# Patient Record
Sex: MALE | Race: WHITE | NOT HISPANIC OR LATINO | Employment: FULL TIME | URBAN - METROPOLITAN AREA
[De-identification: names, ages, dates, MRNs, and addresses within clinical notes are randomized per-mention and may not be internally consistent; named-entity substitution may affect disease eponyms.]

---

## 2018-03-05 PROBLEM — I20.9 ANGINA, CLASS II: Status: ACTIVE | Noted: 2018-03-05

## 2018-04-19 ENCOUNTER — TELEPHONE (OUTPATIENT)
Dept: GASTROENTEROLOGY | Facility: CLINIC | Age: 58
End: 2018-04-19

## 2018-04-19 DIAGNOSIS — K85.90 ACUTE PANCREATITIS, UNSPECIFIED COMPLICATION STATUS, UNSPECIFIED PANCREATITIS TYPE: Primary | ICD-10-CM

## 2018-04-20 NOTE — TELEPHONE ENCOUNTER
Message   Received: Today   Message Contents   MD Sharona Chavez MA   Caller: Unspecified (Yesterday,  5:37 PM)             Please schedule an EUS for pancreatitis. Can be done at [facility] if easier for the patient.   Mejia Lin MD      Please sign order

## 2018-04-26 ENCOUNTER — TELEPHONE (OUTPATIENT)
Dept: GASTROENTEROLOGY | Facility: CLINIC | Age: 58
End: 2018-04-26

## 2018-04-26 NOTE — TELEPHONE ENCOUNTER
Dr Lin,   You placed an order for an EUS. Patient is on Plavix and cannot hold. You asked me to check with the referring MD, Dr Cano. He asked if you could go ahead and do the EUS on Plavix. He and the patient understand that you will not be able to biopsy if there is anything.

## 2018-04-26 NOTE — TELEPHONE ENCOUNTER
Message   Received: Today   Message Contents   MD Sharona Chavez MA   Caller: Unspecified (Today,  8:19 AM)             Ok for EUS on Plavix.

## 2018-04-30 ENCOUNTER — TELEPHONE (OUTPATIENT)
Dept: ENDOSCOPY | Facility: HOSPITAL | Age: 58
End: 2018-04-30

## 2018-04-30 NOTE — TELEPHONE ENCOUNTER
Spoke with patient's wife about instructions for UEUS scheduled 5/4/18 at 0900.  Answered questions.  He is not permitted to hold Plavix due to recent coronary stenting.  Our AES MD, patient and referring physician are aware and procedure will be done but will not plan to biopsy.

## 2018-05-04 ENCOUNTER — HOSPITAL ENCOUNTER (OUTPATIENT)
Facility: HOSPITAL | Age: 58
Discharge: HOME | End: 2018-05-04
Attending: INTERNAL MEDICINE | Admitting: INTERNAL MEDICINE
Payer: COMMERCIAL

## 2018-05-04 ENCOUNTER — ANESTHESIA (OUTPATIENT)
Dept: ENDOSCOPY | Facility: HOSPITAL | Age: 58
End: 2018-05-04
Payer: COMMERCIAL

## 2018-05-04 ENCOUNTER — ANESTHESIA EVENT (OUTPATIENT)
Dept: ENDOSCOPY | Facility: HOSPITAL | Age: 58
End: 2018-05-04
Payer: COMMERCIAL

## 2018-05-04 ENCOUNTER — SURGERY (OUTPATIENT)
Age: 58
End: 2018-05-04

## 2018-05-04 VITALS
WEIGHT: 315 LBS | OXYGEN SATURATION: 98 % | HEIGHT: 70 IN | SYSTOLIC BLOOD PRESSURE: 100 MMHG | TEMPERATURE: 99 F | DIASTOLIC BLOOD PRESSURE: 53 MMHG | HEART RATE: 62 BPM | BODY MASS INDEX: 45.1 KG/M2 | RESPIRATION RATE: 12 BRPM

## 2018-05-04 DIAGNOSIS — K85.90 PANCREATITIS: ICD-10-CM

## 2018-05-04 DIAGNOSIS — R10.84 GENERALIZED ABDOMINAL PAIN: Primary | ICD-10-CM

## 2018-05-04 LAB
POCT GLUCOSE: 135 MG/DL (ref 70–110)
POCT GLUCOSE: 135 MG/DL (ref 70–110)

## 2018-05-04 PROCEDURE — 37000009 HC ANESTHESIA EA ADD 15 MINS: Performed by: INTERNAL MEDICINE

## 2018-05-04 PROCEDURE — 63600175 PHARM REV CODE 636 W HCPCS: Performed by: NURSE ANESTHETIST, CERTIFIED REGISTERED

## 2018-05-04 PROCEDURE — 82962 GLUCOSE BLOOD TEST: CPT | Performed by: INTERNAL MEDICINE

## 2018-05-04 PROCEDURE — 25000003 PHARM REV CODE 250: Performed by: NURSE ANESTHETIST, CERTIFIED REGISTERED

## 2018-05-04 PROCEDURE — D9220A PRA ANESTHESIA: Mod: CRNA,,, | Performed by: NURSE ANESTHETIST, CERTIFIED REGISTERED

## 2018-05-04 PROCEDURE — 43259 EGD US EXAM DUODENUM/JEJUNUM: CPT | Performed by: INTERNAL MEDICINE

## 2018-05-04 PROCEDURE — D9220A PRA ANESTHESIA: Mod: ANES,,, | Performed by: ANESTHESIOLOGY

## 2018-05-04 PROCEDURE — 25000003 PHARM REV CODE 250: Performed by: INTERNAL MEDICINE

## 2018-05-04 PROCEDURE — 43259 EGD US EXAM DUODENUM/JEJUNUM: CPT | Mod: ,,, | Performed by: INTERNAL MEDICINE

## 2018-05-04 PROCEDURE — 37000008 HC ANESTHESIA 1ST 15 MINUTES: Performed by: INTERNAL MEDICINE

## 2018-05-04 RX ORDER — TAMSULOSIN HYDROCHLORIDE 0.4 MG/1
0.4 CAPSULE ORAL DAILY
COMMUNITY
End: 2018-12-31

## 2018-05-04 RX ORDER — LIDOCAINE HYDROCHLORIDE 40 MG/ML
SOLUTION TOPICAL
Status: DISCONTINUED | OUTPATIENT
Start: 2018-05-04 | End: 2018-05-04

## 2018-05-04 RX ORDER — SODIUM CHLORIDE 0.9 % (FLUSH) 0.9 %
3 SYRINGE (ML) INJECTION
Status: DISCONTINUED | OUTPATIENT
Start: 2018-05-04 | End: 2018-05-04 | Stop reason: HOSPADM

## 2018-05-04 RX ORDER — PROPOFOL 10 MG/ML
VIAL (ML) INTRAVENOUS CONTINUOUS PRN
Status: DISCONTINUED | OUTPATIENT
Start: 2018-05-04 | End: 2018-05-04

## 2018-05-04 RX ORDER — GLYCOPYRROLATE 0.2 MG/ML
INJECTION INTRAMUSCULAR; INTRAVENOUS
Status: DISCONTINUED | OUTPATIENT
Start: 2018-05-04 | End: 2018-05-04

## 2018-05-04 RX ORDER — PROPOFOL 10 MG/ML
VIAL (ML) INTRAVENOUS
Status: DISCONTINUED | OUTPATIENT
Start: 2018-05-04 | End: 2018-05-04

## 2018-05-04 RX ORDER — SODIUM CHLORIDE 9 MG/ML
INJECTION, SOLUTION INTRAVENOUS CONTINUOUS
Status: DISCONTINUED | OUTPATIENT
Start: 2018-05-04 | End: 2018-05-04 | Stop reason: HOSPADM

## 2018-05-04 RX ADMIN — SODIUM CHLORIDE: 0.9 INJECTION, SOLUTION INTRAVENOUS at 08:05

## 2018-05-04 RX ADMIN — PROPOFOL 50 MG: 10 INJECTION, EMULSION INTRAVENOUS at 08:05

## 2018-05-04 RX ADMIN — GLYCOPYRROLATE 0.2 MG: 0.2 INJECTION, SOLUTION INTRAMUSCULAR; INTRAVENOUS at 08:05

## 2018-05-04 RX ADMIN — LIDOCAINE HYDROCHLORIDE 3 ML: 40 SOLUTION TOPICAL at 08:05

## 2018-05-04 RX ADMIN — PROPOFOL 150 MCG/KG/MIN: 10 INJECTION, EMULSION INTRAVENOUS at 08:05

## 2018-05-04 NOTE — PLAN OF CARE
Discharge instructions reviewed with pt and wife. Understanding verbalized. No complaints of pain reported. Pt able to tolerate PO intake. MD Boland to bedside to address findings and plan of car. To be transported to car by PCT.

## 2018-05-04 NOTE — ANESTHESIA PREPROCEDURE EVALUATION
05/04/2018  Geo Moss is a 57 y.o., male.      Pre-operative evaluation for Procedure(s) (LRB):  ULTRASOUND-ENDOSCOPIC-UPPER (N/A)    Geo Moss is a 57 y.o. male     Patient Active Problem List   Diagnosis    Intra-abdominal abscess post-procedure    Diabetes mellitus type 2 in obese    Coronary artery disease due to lipid rich plaque    S/P coronary artery stent placement    Abdominal abscess    Complications of bariatric procedures    Generalized abdominal pain    Abdominal pain    Angina, class II    Pancreatitis       Review of patient's allergies indicates:  No Known Allergies    No current facility-administered medications on file prior to encounter.      Current Outpatient Prescriptions on File Prior to Encounter   Medication Sig Dispense Refill    allopurinol (ZYLOPRIM) 100 MG tablet Take 100 mg by mouth once daily.       aspirin (ECOTRIN) 81 MG EC tablet Take 81 mg by mouth once daily.      atorvastatin (LIPITOR) 40 MG tablet Take 40 mg by mouth every Mon, Wed, Fri.       clopidogrel (PLAVIX) 75 mg tablet Take 75 mg by mouth once daily.      empagliflozin (JARDIANCE) 10 mg Tab Take 10 mg by mouth once daily.      furosemide (LASIX) 20 MG tablet Take 20 mg by mouth once daily.      isosorbide mononitrate (IMDUR) 30 MG 24 hr tablet Take 30 mg by mouth once daily.       lisinopril 10 MG tablet Take 10 mg by mouth once daily.      metformin (GLUCOPHAGE) 1000 MG tablet Take 1,000 mg by mouth 2 (two) times daily with meals.      metoprolol tartrate (LOPRESSOR) 50 MG tablet Take 50 mg by mouth 2 (two) times daily.      pantoprazole (PROTONIX) 40 MG tablet Take 40 mg by mouth once daily.      colchicine 0.6 mg tablet Take 0.6 mg by mouth daily as needed (gout flare up).      nitroGLYCERIN (NITROSTAT) 0.4 MG SL tablet Place 0.4 mg under the tongue every 5 (five) minutes as needed  for Chest pain.         Past Surgical History:   Procedure Laterality Date    ABDOMINAL SURGERY      removal of lap band    CARDIAC ANGIOGRAM WITH STENT      KNEE SURGERY Left     lap band removal       LAPAROSCOPIC GASTRIC BANDING      neck fusion      ROTATOR CUFF REPAIR Left        Social History     Social History    Marital status:      Spouse name: N/A    Number of children: N/A    Years of education: N/A     Occupational History    Not on file.     Social History Main Topics    Smoking status: Never Smoker    Smokeless tobacco: Never Used    Alcohol use Yes      Comment: occassionally    Drug use: No    Sexual activity: Not on file     Other Topics Concern    Not on file     Social History Narrative    No narrative on file         CBC: No results for input(s): WBC, RBC, HGB, HCT, PLT, MCV, MCH, MCHC in the last 72 hours.    CMP: No results for input(s): NA, K, CL, CO2, BUN, CREATININE, GLU, MG, PHOS, CALCIUM, ALBUMIN, PROT, ALKPHOS, ALT, AST, BILITOT in the last 72 hours.    INR  No results for input(s): PT, INR, PROTIME, APTT in the last 72 hours.      Anesthesia Evaluation    I have reviewed the Patient Summary Reports.     I have reviewed the Medications.     Review of Systems  Cardiovascular:   Hypertension CAD  CABG/stent     Endocrine:   Diabetes        Physical Exam  General:  Morbid Obesity    Airway/Jaw/Neck:  Airway Findings: Mouth Opening: Normal General Airway Assessment: Adult  Mallampati: III  TM Distance: Normal, at least 6 cm  Jaw/Neck Findings:  Neck ROM: Normal ROM  Neck Findings:  Girth Increased      Dental:  Dental Findings: In tact, upper front caps    Chest/Lungs:  Chest/Lungs Clear    Heart/Vascular:  Heart Findings: Normal       Mental Status:  Mental Status Findings:  Cooperative, Alert and Oriented         Anesthesia Plan  Type of Anesthesia, risks & benefits discussed:  Anesthesia Type:  general  Patient's Preference:   Intra-op Monitoring Plan: standard  ASA monitors  Intra-op Monitoring Plan Comments:   Post Op Pain Control Plan:   Post Op Pain Control Plan Comments:   Induction:   IV  Beta Blocker:  Patient is on a Beta-Blocker and has received one dose within the past 24 hours (No further documentation required).       Informed Consent: Patient understands risks and agrees with Anesthesia plan.  Questions answered. Anesthesia consent signed with patient.  ASA Score: 3     Day of Surgery Review of History & Physical:        Anesthesia Plan Notes: Will have a cotton in the room as backup if needed.         Ready For Surgery From Anesthesia Perspective.

## 2018-05-04 NOTE — H&P
Short Stay Endoscopy History and Physical    PCP - Chris Steele MD  Referring Physician - Roberto Diggs MD  [address] [facility] [facility]  [address]  [place] [address]    Procedure - eus  ASA - per anesthesia  Mallampati - per anesthesia  History of Anesthesia problems - no  Family history Anesthesia problems -  no   Plan of anesthesia - General    HPI:  This is a 57 y.o. male here for evaluation of: pancreatitis    Reflux - no  Dysphagia - no  Abdominal pain - no  Diarrhea - no    ROS:  Constitutional: No fevers, chills, No weight loss  CV: No chest pain  Pulm: No cough, No shortness of breath  Ophtho: No vision changes  GI: see HPI  Derm: No rash    Medical History:  has a past medical history of Acid reflux; Angina, class II; Bronchitis; Coronary artery disease; Diabetes mellitus; Encounter for blood transfusion; Gout; Gout; Hemorrhoids; History of kidney stones; HLD (hyperlipidemia); heart artery stent (03/05/2018); Hyperlipidemia; Hypertension; and Pulmonary edema (2016).    Surgical History:  has a past surgical history that includes Abdominal surgery; Rotator cuff repair (Left); neck fusion; Knee surgery (Left); Laparoscopic gastric banding; lap band removal ; and CARDIAC ANGIOGRAM WITH STENT.    Family History: family history includes Heart disease in his mother..    Social History:  reports that he has never smoked. He has never used smokeless tobacco. He reports that he drinks alcohol. He reports that he does not use drugs.    Review of patient's allergies indicates:  No Known Allergies    Medications:   Prescriptions Prior to Admission   Medication Sig Dispense Refill Last Dose    allopurinol (ZYLOPRIM) 100 MG tablet Take 100 mg by mouth once daily.    5/3/2018 at Unknown time    aspirin (ECOTRIN) 81 MG EC tablet Take 81 mg by mouth once daily.   5/3/2018 at Unknown time    atorvastatin (LIPITOR) 40 MG tablet Take 40 mg by mouth every Mon, Wed, Fri.    Past Week at Unknown time    clopidogrel  (PLAVIX) 75 mg tablet Take 75 mg by mouth once daily.   5/4/2018 at 0600    empagliflozin (JARDIANCE) 10 mg Tab Take 10 mg by mouth once daily.   5/3/2018 at Unknown time    furosemide (LASIX) 20 MG tablet Take 20 mg by mouth once daily.   Past Week at Unknown time    isosorbide mononitrate (IMDUR) 30 MG 24 hr tablet Take 30 mg by mouth once daily.    5/4/2018 at Unknown time    lisinopril 10 MG tablet Take 10 mg by mouth once daily.   5/4/2018 at Unknown time    metformin (GLUCOPHAGE) 1000 MG tablet Take 1,000 mg by mouth 2 (two) times daily with meals.   5/3/2018 at Unknown time    metoprolol tartrate (LOPRESSOR) 50 MG tablet Take 50 mg by mouth 2 (two) times daily.   5/4/2018 at Unknown time    pantoprazole (PROTONIX) 40 MG tablet Take 40 mg by mouth once daily.   5/3/2018 at Unknown time    tamsulosin (FLOMAX) 0.4 mg Cp24 Take 0.4 mg by mouth once daily.   5/3/2018 at Unknown time    colchicine 0.6 mg tablet Take 0.6 mg by mouth daily as needed (gout flare up).       nitroGLYCERIN (NITROSTAT) 0.4 MG SL tablet Place 0.4 mg under the tongue every 5 (five) minutes as needed for Chest pain.   Unknown at Unknown time       Physical Exam:    Vital Signs:   Vitals:    05/04/18 0818   BP: 130/72   Pulse: 60   Resp: 16   Temp: 98.1 °F (36.7 °C)       General Appearance: Well appearing in no acute distress  Eyes:    No scleral icterus  ENT: Neck supple  Lungs: CTA anteriorly  Heart:  Regular rate  Abdomen: Soft, non tender, non distended with normal bowel sounds.  Extremities: No edema  Skin: No rash    Labs:  Lab Results   Component Value Date    WBC 5.30 03/06/2018    HGB 14.6 03/06/2018    HCT 42.7 03/06/2018     03/06/2018    ALT 22 08/25/2016    AST 16 08/25/2016     03/06/2018    K 4.5 03/06/2018     03/06/2018    CREATININE 0.70 (L) 03/06/2018    BUN 18 03/06/2018    CO2 27 03/06/2018    TSH 1.060 06/26/2016    INR 1.4 (H) 06/26/2016    HGBA1C 9.1 (H) 08/25/2016       I have explained  the risks and benefits of this endoscopic procedure to the patient including but not limited to bleeding, inflammation, infection, perforation, and death.      Jenaro Boland MD

## 2018-05-04 NOTE — TRANSFER OF CARE
"Anesthesia Transfer of Care Note    Patient: Geo Moss    Procedure(s) Performed: Procedure(s) (LRB):  ULTRASOUND-ENDOSCOPIC-UPPER (N/A)    Patient location: PACU    Anesthesia Type: general    Transport from OR: Transported from OR on 6-10 L/min O2 by face mask with adequate spontaneous ventilation    Post pain: adequate analgesia    Post assessment: no apparent anesthetic complications    Post vital signs: stable    Level of consciousness: awake, alert and oriented    Nausea/Vomiting: no nausea/vomiting    Complications: none    Transfer of care protocol was followed      Last vitals:   Visit Vitals  /72 (BP Location: Left arm, Patient Position: Lying)   Pulse 60   Temp 36.7 °C (98.1 °F) (Temporal)   Resp 16   Ht 5' 10" (1.778 m)   Wt (!) 149.7 kg (330 lb)   SpO2 97%   BMI 47.35 kg/m²     "

## 2018-05-04 NOTE — PROVATION PATIENT INSTRUCTIONS
Discharge Summary/Instructions after an Endoscopic Procedure  Patient Name: Geo Moss  Patient MRN: [id number]  Patient YOB: 1960  Friday, May 04, 2018  Jenaro Boland MD  RESTRICTIONS:  During your procedure today, you received medications for sedation.  These   medications may affect your judgment, balance and coordination.  Therefore,   for 24 hours, you have the following restrictions:   - DO NOT drive a car, operate machinery, make legal/financial decisions,   sign important papers or drink alcohol.    ACTIVITY:  The following day: return to full activity including work, except no heavy   lifting, straining or running for 3 days if polyps were removed.  DIET:  Eat and drink normally unless instructed otherwise.     TREATMENT FOR COMMON SIDE EFFECTS:  - Mild abdominal pain, nausea, belching, bloating or excessive gas:  rest,   eat lightly and use a heating pad.  - Sore Throat: treat with throat lozenges and/or gargle with warm salt   water.  - Because air was used during the procedure, expelling large amounts of air   from your rectum or belching is normal.  - If a bowel prep was taken, you may not have a bowel movement for 1-3 days.    This is normal.  SYMPTOMS TO WATCH FOR AND REPORT TO YOUR PHYSICIAN:  1. Abdominal pain or bloating, other than gas cramps.  2. Chest pain.  3. Back pain.  4. Signs of infection such as: chills or fever occurring within 24 hours   after the procedure.  5. Rectal bleeding, which would show as bright red, maroon, or black stools.   (A tablespoon of blood from the rectum is not serious, especially if   hemorrhoids are present.)  6. Vomiting.  7. Weakness or dizziness.  GO DIRECTLY TO THE NEAREST EMERGENCY ROOM IF YOU HAVE ANY OF THE FOLLOWING:      Difficulty breathing              Chills and/or fever over 101 F   Persistent vomiting and/or vomiting blood   Severe abdominal pain   Severe chest pain   Black, tarry stools   Bleeding- more than one tablespoon   Any other  symptom or condition that you feel may need urgent attention  Your doctor recommends these additional instructions:  If any biopsies were taken, your doctors clinic will contact you in 1 to 2   weeks with any results.  - Discharge patient to home.   - Resume previous diet.   - Continue present medications.   - Return to referring physician as previously scheduled.  For questions, problems or results please call your physician - Jenaro Boland MD at Work:  [phone].  [facility], EMERGENCY ROOM PHONE NUMBER: [phone]  IF A COMPLICATION OR EMERGENCY SITUATION ARISES AND YOU ARE UNABLE TO REACH   YOUR PHYSICIAN - GO DIRECTLY TO THE EMERGENCY ROOM.  Jenaro Boland MD  5/4/2018 9:12:28 AM  This report has been verified and signed electronically.

## 2018-05-04 NOTE — ANESTHESIA POSTPROCEDURE EVALUATION
"Anesthesia Post Evaluation    Patient: Geo Moss    Procedure(s) Performed: Procedure(s) (LRB):  ULTRASOUND-ENDOSCOPIC-UPPER (N/A)    Final Anesthesia Type: general  Patient location during evaluation: [facility]  Patient participation: Yes- Able to Participate  Level of consciousness: awake and alert and oriented  Post-procedure vital signs: reviewed and stable  Pain management: adequate  Airway patency: patent  PONV status at discharge: No PONV  Anesthetic complications: no      Cardiovascular status: blood pressure returned to baseline  Respiratory status: unassisted and spontaneous ventilation  Hydration status: euvolemic  Follow-up not needed.        Visit Vitals  BP (!) 100/53   Pulse 62   Temp 37 °C (98.6 °F) (Skin)   Resp 12   Ht 5' 10" (1.778 m)   Wt (!) 149.7 kg (330 lb)   SpO2 98%   BMI 47.35 kg/m²       Pain/Louis Score: Pain Assessment Performed: Yes (5/4/2018  9:53 AM)  Presence of Pain: denies (5/4/2018  9:53 AM)  Louis Score: 10 (5/4/2018  9:53 AM)      "

## 2018-12-31 PROBLEM — Z79.4 UNCONTROLLED TYPE 2 DIABETES MELLITUS WITH HYPERGLYCEMIA, WITH LONG-TERM CURRENT USE OF INSULIN: Status: ACTIVE | Noted: 2018-12-31

## 2018-12-31 PROBLEM — I10 ESSENTIAL HYPERTENSION: Status: ACTIVE | Noted: 2018-12-31

## 2018-12-31 PROBLEM — E11.65 UNCONTROLLED TYPE 2 DIABETES MELLITUS WITH HYPERGLYCEMIA, WITH LONG-TERM CURRENT USE OF INSULIN: Status: ACTIVE | Noted: 2018-12-31

## 2018-12-31 PROBLEM — E78.2 MIXED HYPERLIPIDEMIA: Status: ACTIVE | Noted: 2018-12-31

## 2019-03-28 PROBLEM — E66.813 CLASS 3 SEVERE OBESITY DUE TO EXCESS CALORIES WITH SERIOUS COMORBIDITY AND BODY MASS INDEX (BMI) OF 45.0 TO 49.9 IN ADULT: Status: ACTIVE | Noted: 2019-03-28

## 2019-03-28 PROBLEM — E66.01 CLASS 3 SEVERE OBESITY DUE TO EXCESS CALORIES WITH SERIOUS COMORBIDITY AND BODY MASS INDEX (BMI) OF 45.0 TO 49.9 IN ADULT: Status: ACTIVE | Noted: 2019-03-28

## 2021-03-15 PROBLEM — I20.89 EXERTIONAL ANGINA: Status: ACTIVE | Noted: 2021-03-15

## 2021-03-15 PROBLEM — I25.10 CAD (CORONARY ARTERY DISEASE): Status: ACTIVE | Noted: 2021-03-15

## 2021-03-15 PROBLEM — R94.39 ABNORMAL MYOCARDIAL PERFUSION STUDY: Status: ACTIVE | Noted: 2021-03-15

## 2022-01-31 ENCOUNTER — TELEPHONE (OUTPATIENT)
Dept: CARDIOTHORACIC SURGERY | Facility: CLINIC | Age: 62
End: 2022-01-31
Payer: COMMERCIAL

## 2022-01-31 PROBLEM — I20.89 ANGINAL EQUIVALENT: Status: ACTIVE | Noted: 2022-01-31

## 2022-01-31 PROBLEM — R06.09 DYSPNEA ON EXERTION: Status: ACTIVE | Noted: 2022-01-31

## 2022-01-31 NOTE — TELEPHONE ENCOUNTER
Called pt to schedule consultation appointment. Pt scheduled for first available appointment. Provided directions to clinic location. Pt verbalized understanding.       ----- Message from Evelia Paulino RN sent at 2022 10:49 AM CST -----  Regarding: Referral  Referral for CABG  Op note below  Sending discs via mail and [facility]    Patient name: Geo Moss  MRN: [id number]  : 1960  Surgery Date: 2022   Performing Physician: Justin Garcia MD  Pre-op Diagnosis:  Abnormal myocardial perfusion study [R94.39]  Angina, class II [I20.9]  CAD S/P percutaneous coronary angioplasty [I25.10, Z98.61]  DM (diabetes mellitus) [E11.9]  Chronic diastolic heart failure [I50.32]  Post-op Diagnosis:  Post-Op Diagnosis Codes:     # Abnormal myocardial perfusion study [R94.39]     # Angina, class II [I20.9]     # CAD S/P percutaneous coronary angioplasty [I25.10, Z98.61]     # DM (diabetes mellitus) [E11.9]     # Chronic diastolic heart failure [I50.32] Procedure(s) (LRB):  Left heart cath (Left)  Anesthesia: RN IV Sedation  INDICATIONS:   Dyspnea on exertion, angina equivalent CCS class 2, abnormal myocardial perfusion scan, history of multivessel CAD and previous PCI with multiple risk factors for CAD.  PROCEDURE:  Right and Left heart catheterization, coronary angiography.  ACCESS SITE:  Left radial artery, left brachial vein. Post operative hemostasis achieved with manual compression.  HEMODYNAMIC-ANGIOGRAPHIC DATA:         1. LM:  Normal.  2. LAD:  Stents with ostial to mid vessel stents with diffuse 70% InStent stenosis.  Mid to distal diffuse 70-80% stenosis.  3. CFx:  Mid to distal stents was 99% InStent stenosis followed by 30-40% stenosis at distal and of stent.  4. OM3:  Ostial stent patent  5. OM5:  Ostial stent 70-80% InStent stenosis at ostium  6. RCA:  Ostial 70-80% lesion 60% mid vessel 60% lesion.  RV branch 99% diffuse proximal lesion  7. PLB:  Proximal mild disease  8. PDA:.  Ostial 70% and distal 90%  lesions  9. PCWP:  29 mmHg.  10. PA:  44/30 mmHg.  11. RV:  43/20 mmHg.  12. RA:  20 mmHg.     Impression:  Patient's coronary angiogram shows the significant InStent restenosis of the proximal left anterior descending artery stent, stents in the left circumflex coronary artery and obtuse marginal branch.  As well as patient has disease progression in the mid to distal left anterior descending artery as well as the severe disease of the right coronary artery.  Patient also has evidence of distal RCA, RPDA and RPLA diffuse stenosis.     His right-sided pressures elevated in the 40-45 mm Hg range with elevated pulmonary capillary wedge pressure of 29mmHg.     In view of history of type 2 diabetes mellitus, obesity we will consider the the referral for CV surgery consultation.  I discussed this with the patient.  His options are multivessel PCI versus the consideration towards the coronary artery bypass surgery.  We will obtain CV surgery consultation with Dr. Lott. Patient will be re-evaluated after CV surgery consultation.     In the interim, we will increase the dose of Lasix, continue Ranexa, isosorbide mononitrate and discontinue lisinopril due to low normal blood pressure.     I have advised the patient to contact me if he has progressive symptoms and or present to the emergency room.        Complications:  None  Total Contrast Given to the Patient:  120 mL  Estimated Blood Loss:  10 mL  Placement:  Observation     RECOMMENDATIONS/PLAN:   1. Results discussed with family.   2. CV surgery consult with Dr. Lott at [facility] as patient is high risk due to comorbidities  3. Discontinue lisinopril due to low normal blood pressure.  Will evaluate resuming as outpatient  4. Increase Lasix to 40 mg b.i.d. (dose increased)  5. Continue isosorbide and Ranexa  6. Resume metformin in 48 hours  7. If remains stable, discharge home later today once IV fluids and bed rest complete.  8. Follow-up as directed.

## 2022-02-08 ENCOUNTER — OFFICE VISIT (OUTPATIENT)
Dept: CARDIOTHORACIC SURGERY | Facility: CLINIC | Age: 62
End: 2022-02-08
Payer: COMMERCIAL

## 2022-02-08 VITALS
DIASTOLIC BLOOD PRESSURE: 69 MMHG | HEART RATE: 70 BPM | HEIGHT: 70 IN | BODY MASS INDEX: 45.1 KG/M2 | OXYGEN SATURATION: 95 % | SYSTOLIC BLOOD PRESSURE: 144 MMHG | WEIGHT: 315 LBS

## 2022-02-08 DIAGNOSIS — I25.10 CORONARY ARTERY DISEASE, UNSPECIFIED VESSEL OR LESION TYPE, UNSPECIFIED WHETHER ANGINA PRESENT, UNSPECIFIED WHETHER NATIVE OR TRANSPLANTED HEART: ICD-10-CM

## 2022-02-08 DIAGNOSIS — Z95.5 S/P CORONARY ARTERY STENT PLACEMENT: Primary | ICD-10-CM

## 2022-02-08 PROCEDURE — 3008F PR BODY MASS INDEX (BMI) DOCUMENTED: ICD-10-PCS | Mod: CPTII,S$GLB,, | Performed by: THORACIC SURGERY (CARDIOTHORACIC VASCULAR SURGERY)

## 2022-02-08 PROCEDURE — 99999 PR PBB SHADOW E&M-EST. PATIENT-LVL III: ICD-10-PCS | Mod: PBBFAC,,, | Performed by: THORACIC SURGERY (CARDIOTHORACIC VASCULAR SURGERY)

## 2022-02-08 PROCEDURE — 99205 OFFICE O/P NEW HI 60 MIN: CPT | Mod: S$GLB,,, | Performed by: THORACIC SURGERY (CARDIOTHORACIC VASCULAR SURGERY)

## 2022-02-08 PROCEDURE — 3008F BODY MASS INDEX DOCD: CPT | Mod: CPTII,S$GLB,, | Performed by: THORACIC SURGERY (CARDIOTHORACIC VASCULAR SURGERY)

## 2022-02-08 PROCEDURE — 3078F DIAST BP <80 MM HG: CPT | Mod: CPTII,S$GLB,, | Performed by: THORACIC SURGERY (CARDIOTHORACIC VASCULAR SURGERY)

## 2022-02-08 PROCEDURE — 3077F SYST BP >= 140 MM HG: CPT | Mod: CPTII,S$GLB,, | Performed by: THORACIC SURGERY (CARDIOTHORACIC VASCULAR SURGERY)

## 2022-02-08 PROCEDURE — 99205 PR OFFICE/OUTPT VISIT, NEW, LEVL V, 60-74 MIN: ICD-10-PCS | Mod: S$GLB,,, | Performed by: THORACIC SURGERY (CARDIOTHORACIC VASCULAR SURGERY)

## 2022-02-08 PROCEDURE — 99999 PR PBB SHADOW E&M-EST. PATIENT-LVL III: CPT | Mod: PBBFAC,,, | Performed by: THORACIC SURGERY (CARDIOTHORACIC VASCULAR SURGERY)

## 2022-02-08 PROCEDURE — 3078F PR MOST RECENT DIASTOLIC BLOOD PRESSURE < 80 MM HG: ICD-10-PCS | Mod: CPTII,S$GLB,, | Performed by: THORACIC SURGERY (CARDIOTHORACIC VASCULAR SURGERY)

## 2022-02-08 PROCEDURE — 3077F PR MOST RECENT SYSTOLIC BLOOD PRESSURE >= 140 MM HG: ICD-10-PCS | Mod: CPTII,S$GLB,, | Performed by: THORACIC SURGERY (CARDIOTHORACIC VASCULAR SURGERY)

## 2022-02-08 RX ORDER — PIOGLITAZONEHYDROCHLORIDE 15 MG/1
15 TABLET ORAL DAILY
COMMUNITY
End: 2022-02-08

## 2022-02-08 NOTE — LETTER
[facility] - Cardi[facility]asc S[facility] 2nd Fl  [address]  [facility] [address]  Phone: [phone] February 8, 2022        Justin Garcia MD  [address] [place] [address]  [place] [address]    Patient: Geo Moss   MR Number: [id number]   YOB: 1960   Date of Visit: 2/8/2022     Dear Dr. Garcia:     I had the pleasure of seeing your patient, Mr. Nirmal Moss, in clinic today.  As you know, he is very pleasant 61-year-old gentleman with coronary disease and frequent angina.  I saw him in clinic today, and discussed his situation with him and his wife in detail.  Given his fairly diffuse disease, I believe the benefit of surgical revascularization would be diminished.  Given his comorbid conditions, I believe his operative risk would be quite high.  As we discussed on the phone, I agree that percutaneous intervention would be his best option.      Thank you for sending this pleasant gentleman to me.  It was a pleasure to meet him.    Sincerely,          Ramses Lott MD    [facility]

## 2022-02-08 NOTE — PROGRESS NOTES
Subjective:      Patient ID: Geo Moss is a 61 y.o. male.    Chief Complaint: No chief complaint on file.      HPI:  Geo Moss is a 61 y.o. male who presents for surgical evaluation of CAD. Medical conditions include CHF, HTN, Gout, HLD, PCI, DM. Patient was following with cardiology for increasing SOB and had an abnormal PET stress. Subsequently underwent LHC which revealed multivessel disease / in-stent re-stenosis. Patient states that in March of 2021 he began having increasing SOB and chest pain. Had an angiogram with his cardiologist who reported that everything was fine from a cardaci standpoint. Patient did not have improvement in symptoms. Saw a pulmonologist who completed a workup which was also unrevealing. Went back to his cardiologist who performed a PET stress and then repeat LHC. Was told that there was nothing from an interventional standpoint that could be done and was referred to Dr. Lott for high risk surgery evaluation. Reports that he has chest pain nearly daily with exertion. Sometimes radiates to his right shoulder with associated nausea and diaphoresis. Resolves with rest or with nitroglycerin. Episodes last around 10-15 minutes. States that the shoulder pain is similar to the pain he had with prior heart attack. Also endorses SOB. States walking from clinic to the check in area would cause breathing issues. Does not use any assistive walking devices at home. His lasix was recently increased to 40 BID and he reports some mild improvement in breathing and lower extremity swelling. Occasional dizziness. Has slept inclined for years. Had lap band surgery in 2009 with complications and repeat surgery in 2016. Reports blood glucose this morning was 162. In the afternoons ranges from 146-120. No prior strokes, seizures, blood clots, sternotomies. No smoking or drinking history.       Family and social history reviewed    Review of patient's allergies indicates:   Allergen Reactions     Niacin      Past Medical History:   Diagnosis Date    Acid reflux     Angina, class II     Bronchitis     CHF (congestive heart failure)     Coronary artery disease     stents placed in 98    Diabetes mellitus     Encounter for blood transfusion     Essential hypertension 12/31/2018    Gout     Gout     Hemorrhoids     History of 2019 novel coronavirus disease (COVID-19) 12/2021    History of kidney stones     HLD (hyperlipidemia)     Hx of heart artery stent 03/05/2018    two stents     Hyperlipidemia     Hypertension     Irregular heart rate     Liver disease     SPOTS ON LIVER- INFECTION    Mixed hyperlipidemia 12/31/2018    Pancreatitis     Pulmonary edema 2016     Past Surgical History:   Procedure Laterality Date    ABDOMINAL SURGERY      removal of lap band    CARDIAC ANGIOGRAM WITH STENT      KNEE SURGERY Left     lap band removal       LAPAROSCOPIC GASTRIC BANDING      LEFT HEART CATHETERIZATION Left 3/15/2021    Procedure: Left heart cath;  Surgeon: Justin Garcia MD;  [facility]  Service: Cardiology;  Laterality: Left;    LEFT HEART CATHETERIZATION Left 1/31/2022    Procedure: Left heart cath;  Surgeon: Justin Garcia MD;  [facility];  Service: Cardiology;  Laterality: Left;    neck fusion      ROTATOR CUFF REPAIR Left      Family History     Problem Relation (Age of Onset)    Cancer Mother    Heart disease Mother, Father        Social History     Socioeconomic History    Marital status:    Tobacco Use    Smoking status: Never Smoker    Smokeless tobacco: Former User   Substance and Sexual Activity    Alcohol use: Yes     Comment: occassionally    Drug use: Not Currently     Types: Marijuana       Current medications Reviewed    Review of Systems   Constitutional: Positive for activity change and fatigue.   HENT: Negative for nosebleeds.    Eyes: Negative for visual disturbance.   Respiratory: Positive for shortness of breath.    Cardiovascular:  Positive for chest pain. Negative for palpitations and leg swelling.   Gastrointestinal: Negative for nausea.   Musculoskeletal: Negative for gait problem.   Skin: Negative for color change.   Neurological: Positive for dizziness. Negative for seizures.   Hematological: Does not bruise/bleed easily.   Psychiatric/Behavioral: Negative for sleep disturbance.     Objective:   Physical Exam  Vitals reviewed.   Constitutional:       General: He is not in acute distress.     Appearance: He is well-developed. He is obese. He is not diaphoretic.   HENT:      Head: Normocephalic and atraumatic.   Eyes:      Pupils: Pupils are equal, round, and reactive to light.   Neck:      Vascular: No JVD.   Cardiovascular:      Rate and Rhythm: Normal rate.   Pulmonary:      Effort: Pulmonary effort is normal. No respiratory distress.   Abdominal:      General: There is no distension.   Musculoskeletal:         General: No swelling. Normal range of motion.      Cervical back: Normal range of motion.   Skin:     General: Skin is warm and dry.   Neurological:      Mental Status: He is alert and oriented to person, place, and time.   Psychiatric:         Speech: Speech normal.         Behavior: Behavior normal.         Thought Content: Thought content normal.         Judgment: Judgment normal.         Diagnostic Results: reviewed   [facility] 1/31/22  1. LM:  Normal.  2. LAD:  Stents with ostial to mid vessel stents with diffuse 70% InStent stenosis.  Mid to distal diffuse 70-80% stenosis.  3. CFx:  Mid to distal stents was 99% InStent stenosis followed by 30-40% stenosis at distal and of stent.  4. OM3:  Ostial stent patent  5. OM5:  Ostial stent 70-80% InStent stenosis at ostium  6. RCA:  Ostial 70-80% lesion 60% mid vessel 60% lesion.  RV branch 99% diffuse proximal lesion  7. PLB:  Proximal mild disease  8. PDA:.  Ostial 70% and distal 90% lesions  9. PCWP:  29 mmHg.  10. PA:  44/30 mmHg.  11. RV:  43/20 mmHg.  12. RA:  20  mmHg.      Assessment:   CAD  Plan:     CTS Attending Note:    I have personally taken the history and examined this patient and agree with the JOSSIE's note as stated above.  Pleasant 61-year-old gentleman with symptomatic coronary disease.  I reviewed his angiogram. He has diffuse disease in multiple territories.  Given his comorbid conditions, I believe his risk is greater than benefit for surgical revascularization.  I discussed with Dr. Garcia by phone.  We will plan for percutaneous coronary intervention.

## 2022-12-19 PROBLEM — I25.82 CHRONIC TOTAL OCCLUSION OF CORONARY ARTERY: Status: ACTIVE | Noted: 2022-12-19

## 2025-07-15 DIAGNOSIS — Z96.652 S/P TOTAL KNEE ARTHROPLASTY, LEFT: Primary | ICD-10-CM

## 2025-07-31 ENCOUNTER — CLINICAL SUPPORT (OUTPATIENT)
Dept: REHABILITATION | Facility: HOSPITAL | Age: 65
End: 2025-07-31
Payer: MEDICARE

## 2025-07-31 DIAGNOSIS — Z96.652 S/P TOTAL KNEE ARTHROPLASTY, LEFT: Primary | ICD-10-CM

## 2025-07-31 PROCEDURE — 97161 PT EVAL LOW COMPLEX 20 MIN: CPT | Mod: PN

## 2025-07-31 PROCEDURE — 97032 APPL MODALITY 1+ESTIM EA 15: CPT | Mod: PN

## 2025-07-31 PROCEDURE — 97110 THERAPEUTIC EXERCISES: CPT | Mod: PN

## 2025-07-31 NOTE — PROGRESS NOTES
Outpatient Rehab    Physical Therapy Evaluation    Patient Name: Geo Moss  MRN: [id number]  YOB: 1960  Encounter Date: 7/31/2025    Therapy Diagnosis:   Encounter Diagnosis   Name Primary?    S/P total knee arthroplasty, left Yes     Physician: Paula Hale P*    Physician Orders: Eval and Treat  Medical Diagnosis: S/P total knee arthroplasty, left  Surgical Diagnosis: L TKA   Surgical Date: 7/28/2025  Days Since Last Surgery: 3    Visit # / Visits Authorized:  1 / 1  Insurance Authorization Period: 7/15/2025 to 7/15/2026  Date of Evaluation: 7/31/2025  Plan of Care Certification: 7/31/2025 to 9/11/2025     Time In: 1140   Time Out: 1220  Total Time (in minutes): 40   Total Billable Time (in minutes): 40    Intake Outcome Measure for FOTO Survey    Therapist reviewed FOTO scores for Geo Moss on 7/31/2025.   FOTO report - see Media section or FOTO account episode details.     Intake Score (%): 42    Precautions:       Subjective   History of Present Illness  Geo is a 65 y.o. male      The patient reports a medical diagnosis of L TKA.      Patient reports a surgery of L TKA. Surgery occurred on 07/28/25.     Diagnostic tests related to this condition: X-ray.          History of Present Condition/Illness: Patient is 66 y/o WM who presents status post left total knee arthroplasty which was performed on 7-. He states that he fell in February fracturing his hip and had a left HENRY. He received home health physical therapy following his surgery.          Pain     Patient reports a current pain level of 5/10. Pain at best is reported as 0/10. Pain at worst is reported as 10/10.   Location: L knee  Clinical Progression (since onset): Stable  Pain Qualities: Throbbing  Pain-Relieving Factors: Ice, Elevation, Medications - prescription  Pain-Aggravating Factors: Walking         Treatment History  Treatments  Previously Received Treatments: Yes  Previous Treatments: Physical  therapy    Living Arrangements  Living Situation  Living Arrangements: Family members        Employment     Employment Status: Retired         Past Medical History/Physical Systems Review:   Geo Moss  has a past medical history of Acid reflux, Angina, class II, Bronchitis, CHF (congestive heart failure), Coronary artery disease, Diabetes mellitus, Encounter for blood transfusion, Essential hypertension, Gout, Gout, Hemorrhoids, History of 2019 novel coronavirus disease (COVID-19), History of kidney stones, HLD (hyperlipidemia), heart artery stent, Hyperlipidemia, Hypertension, Irregular heart rate, Liver disease, Mixed hyperlipidemia, Pancreatitis, Pulmonary edema, and Sleep apnea.    Geo Moss  has a past surgical history that includes Abdominal surgery; Rotator cuff repair (Left); neck fusion; Knee surgery (Left); Laparoscopic gastric banding; lap band removal ; CARDIAC ANGIOGRAM WITH STENT; Left heart catheterization (Left, 03/15/2021); Left heart catheterization (Left, 01/31/2022); Left heart catheterization (Left, 02/14/2022); Left heart catheterization (Left, 03/07/2022); Left heart catheterization (Left, 12/08/2022); Left heart catheterization (Left, 12/12/2022); Coronary angioplasty with stent (N/A, 12/19/2022); Carpal tunnel release (Right, 11/15/2023); and Carpal tunnel release (Right, 01/2024).    Geo has a current medication list which includes the following prescription(s): allopurinol, aspirin, atorvastatin, clopidogrel, dapagliflozin propanediol, eszopiclone, furosemide, gabapentin, isosorbide mononitrate, lisinopril, metformin, metoprolol tartrate, nitroglycerin, pantoprazole, ranolazine, tamsulosin, and mounjaro.    Review of patient's allergies indicates:   Allergen Reactions    Niacin         Objective   Knee Observations  Left Knee Observations  Present: Effusion and Incision  Left Knee Incision Observations  Present: Clean and Dry  Not Present: Drainage             Knee  Swelling  Location of Measurement Right  (cm) Left  (cm)   20 cm Above Joint Line       10 cm Vastus Medialis Oblique   54   At Joint Line   48   15 cm Below Joint Line                 Knee Range of Motion   Right Knee   Active (deg) Passive (deg) Pain   Flexion 110       Extension -5           Left Knee   Active (deg) Passive (deg) Pain   Flexion 35       Extension -10                          Knee Strength   Right Strength Right Pain Left Strength Left  Pain   Flexion (S2) 4         Prone Flexion 4         Extension (L3) 4                       Ambulation Assistance Required  Surface With  Assistive Device Without Assistive Device Details   Level Independent    Patient ambulating with RW with antalgic limp   Uneven         Curb           Gait Analysis  Gait Pattern: Antalgic                   Treatment:  Therapeutic Exercise  TE 1: 2 x 10 glute sets  TE 2: 2 x 10 RTB ankle pumps  Manual Therapy  MT 1: PROM L knee flexion  Modalities  Cryotherapy (Minutes\Location): 5 min post-treatment  Electrical Stimulation (Parameters): 10 min to L quad/VMO with quad set 10 sec on/off    Time Entry(in minutes):  PT Evaluation (Low) Time Entry: 20  E-Stim (Attended) Time Entry: 10  Therapeutic Exercise Time Entry: 10    Assessment & Plan   Assessment  Geo presents with a condition of Low complexity.   Presentation of Symptoms: Stable       Functional Limitations: Range of motion, Proprioception, Functional mobility  Impairments: Pain with functional activity, Lack of appropriate home exercise program, Impaired physical strength  Personal Factors Affecting Prognosis: Pain    Patient Goal for Therapy (PT): Patient to return to previous level of function.  Prognosis: Good  Prognosis Details: Patient prognosis is Good.  Patient will benefit from skilled outpatient Physical Therapy to address the deficits stated above and in the chart below, provide patient /family education, and to maximize patientt's level of independence.     Assessment Details: Geo is a 65 y.o. male referred to outpatient Physical Therapy with a medical diagnosis of left TKA. Patient presents with decreased strength, ROM, impaired gait and balance and increased pain. Patient would benefit from skilled physical therapy to address the above mentioned deficits.      Plan  From a physical therapy perspective, the patient would benefit from: Skilled Rehab Services    Planned therapy interventions include: Therapeutic exercise, Therapeutic activities, Neuromuscular re-education, and Manual therapy.    Planned modalities to include: Electrical stimulation - attended, Cryotherapy (cold pack), and Thermotherapy (hot pack).        Visit Frequency: 2 times Per Week for 5 Weeks.       This plan was discussed with Patient.   Discussion participants: Agreed Upon Plan of Care             The patient's spiritual, cultural, and educational needs were considered, and the patient is agreeable to the plan of care and goals.     Education  Education was done with Patient. The patient's learning style includes Demonstration. The patient Demonstrates understanding.                 Goals:   Active       Ambulation/movement       Patient will ambulate with normal gait pattern without AD.        Start:  07/31/25    Expected End:  09/11/25               Functional outcome       Patient stated goal: Patient to return to previous level of function.        Start:  07/31/25    Expected End:  09/11/25            Patient will demonstrate independence in home program for support of progression       Start:  07/31/25    Expected End:  09/11/25               Pain       Patient will report pain of 2/10 demonstrating a reduction of overall pain       Start:  07/31/25    Expected End:  09/11/25               Range of Motion       Patient will achieve left knee ROM of  0-115 degrees        Start:  07/31/25    Expected End:  09/11/25               Strength       Patient will achieve left knee flexion  strength of 4/5       Start:  07/31/25    Expected End:  09/11/25            Patient will achieve left knee extension strength of 4/5       Start:  07/31/25    Expected End:  09/11/25                Kelly Wheeler PT

## 2025-07-31 NOTE — LETTER
July 31, 2025  Paula Hale PA-C  [address] [facility] [id number]      To whom it may concern,     The attached plan of care is being sent to you for review and reference.    You may indicate your approval by signing the document electronically, or by faxing/mailing a signed copy of the final page of this document back to the attention of Kelly Wheeler, PT:         Plan of Care 7/31/25   Effective from: 7/31/2025  Effective to: 9/11/2025    Plan ID: [id number]            Participants as of Finalize on 7/31/2025    Name Type Comments Contact Info    Paula Hale PA-C Referring Provider  [phone]       Last Plan Note     Author: Kelly Wheeler PT Status: Signed Last edited: 7/31/2025 11:45 AM         Outpatient Rehab    Physical Therapy Evaluation    Patient Name: Geo Moss  MRN: [id number]  YOB: 1960  Encounter Date: 7/31/2025    Therapy Diagnosis:   Encounter Diagnosis   Name Primary?    S/P total knee arthroplasty, left Yes     Physician: Paula Hale P*    Physician Orders: Eval and Treat  Medical Diagnosis: S/P total knee arthroplasty, left  Surgical Diagnosis: L TKA   Surgical Date: 7/28/2025  Days Since Last Surgery: 3    Visit # / Visits Authorized:  1 / 1  Insurance Authorization Period: 7/15/2025 to 7/15/2026  Date of Evaluation: 7/31/2025  Plan of Care Certification: 7/31/2025 to 9/11/2025     Time In: 1140   Time Out: 1220  Total Time (in minutes): 40   Total Billable Time (in minutes): 40    Intake Outcome Measure for FOTO Survey    Therapist reviewed FOTO scores for Geo Moss on 7/31/2025.   FOTO report - see Media section or FOTO account episode details.     Intake Score (%): 42    Precautions:       Subjective   History of Present Illness  Geo is a 65 y.o. male      The patient reports a medical diagnosis of L TKA.      Patient reports a surgery of L TKA. Surgery occurred on 07/28/25.     Diagnostic tests related to this condition:  X-ray.          History of Present Condition/Illness: Patient is 64 y/o WM who presents status post left total knee arthroplasty which was performed on 7-. He states that he fell in February fracturing his hip and had a left HENRY. He received home health physical therapy following his surgery.          Pain     Patient reports a current pain level of 5/10. Pain at best is reported as 0/10. Pain at worst is reported as 10/10.   Location: L knee  Clinical Progression (since onset): Stable  Pain Qualities: Throbbing  Pain-Relieving Factors: Ice, Elevation, Medications - prescription  Pain-Aggravating Factors: Walking         Treatment History  Treatments  Previously Received Treatments: Yes  Previous Treatments: Physical therapy    Living Arrangements  Living Situation  Living Arrangements: Family members        Employment     Employment Status: Retired         Past Medical History/Physical Systems Review:   Geo Moss  has a past medical history of Acid reflux, Angina, class II, Bronchitis, CHF (congestive heart failure), Coronary artery disease, Diabetes mellitus, Encounter for blood transfusion, Essential hypertension, Gout, Gout, Hemorrhoids, History of 2019 novel coronavirus disease (COVID-19), History of kidney stones, HLD (hyperlipidemia), heart artery stent, Hyperlipidemia, Hypertension, Irregular heart rate, Liver disease, Mixed hyperlipidemia, Pancreatitis, Pulmonary edema, and Sleep apnea.    Geo Moss  has a past surgical history that includes Abdominal surgery; Rotator cuff repair (Left); neck fusion; Knee surgery (Left); Laparoscopic gastric banding; lap band removal ; CARDIAC ANGIOGRAM WITH STENT; Left heart catheterization (Left, 03/15/2021); Left heart catheterization (Left, 01/31/2022); Left heart catheterization (Left, 02/14/2022); Left heart catheterization (Left, 03/07/2022); Left heart catheterization (Left, 12/08/2022); Left heart catheterization (Left, 12/12/2022); Coronary  angioplasty with stent (N/A, 12/19/2022); Carpal tunnel release (Right, 11/15/2023); and Carpal tunnel release (Right, 01/2024).    Geo has a current medication list which includes the following prescription(s): allopurinol, aspirin, atorvastatin, clopidogrel, dapagliflozin propanediol, eszopiclone, furosemide, gabapentin, isosorbide mononitrate, lisinopril, metformin, metoprolol tartrate, nitroglycerin, pantoprazole, ranolazine, tamsulosin, and mounjaro.    Review of patient's allergies indicates:   Allergen Reactions    Niacin         Objective   Knee Observations  Left Knee Observations  Present: Effusion and Incision  Left Knee Incision Observations  Present: Clean and Dry  Not Present: Drainage             Knee Swelling  Location of Measurement Right  (cm) Left  (cm)   20 cm Above Joint Line       10 cm Vastus Medialis Oblique   54   At Joint Line   48   15 cm Below Joint Line                 Knee Range of Motion   Right Knee   Active (deg) Passive (deg) Pain   Flexion 110       Extension -5           Left Knee   Active (deg) Passive (deg) Pain   Flexion 35       Extension -10                          Knee Strength   Right Strength Right Pain Left Strength Left  Pain   Flexion (S2) 4         Prone Flexion 4         Extension (L3) 4                       Ambulation Assistance Required  Surface With  Assistive Device Without Assistive Device Details   Level Independent    Patient ambulating with RW with antalgic limp   Uneven         Curb           Gait Analysis  Gait Pattern: Antalgic                   Treatment:  Therapeutic Exercise  TE 1: 2 x 10 glute sets  TE 2: 2 x 10 RTB ankle pumps  Manual Therapy  MT 1: PROM L knee flexion  Modalities  Cryotherapy (Minutes\Location): 5 min post-treatment  Electrical Stimulation (Parameters): 10 min to L quad/VMO with quad set 10 sec on/off    Time Entry(in minutes):  PT Evaluation (Low) Time Entry: 20  E-Stim (Attended) Time Entry: 10  Therapeutic Exercise Time Entry:  10    Assessment & Plan   Assessment  Geo presents with a condition of Low complexity.   Presentation of Symptoms: Stable       Functional Limitations: Range of motion, Proprioception, Functional mobility  Impairments: Pain with functional activity, Lack of appropriate home exercise program, Impaired physical strength  Personal Factors Affecting Prognosis: Pain    Patient Goal for Therapy (PT): Patient to return to previous level of function.  Prognosis: Good  Prognosis Details: Patient prognosis is Good.  Patient will benefit from skilled outpatient Physical Therapy to address the deficits stated above and in the chart below, provide patient /family education, and to maximize patientt's level of independence.    Assessment Details: Geo is a 65 y.o. male referred to outpatient Physical Therapy with a medical diagnosis of left TKA. Patient presents with decreased strength, ROM, impaired gait and balance and increased pain. Patient would benefit from skilled physical therapy to address the above mentioned deficits.      Plan  From a physical therapy perspective, the patient would benefit from: Skilled Rehab Services    Planned therapy interventions include: Therapeutic exercise, Therapeutic activities, Neuromuscular re-education, and Manual therapy.    Planned modalities to include: Electrical stimulation - attended, Cryotherapy (cold pack), and Thermotherapy (hot pack).        Visit Frequency: 2 times Per Week for 5 Weeks.       This plan was discussed with Patient.   Discussion participants: Agreed Upon Plan of Care             The patient's spiritual, cultural, and educational needs were considered, and the patient is agreeable to the plan of care and goals.     Education  Education was done with Patient. The patient's learning style includes Demonstration. The patient Demonstrates understanding.                 Goals:   Active       Ambulation/movement       Patient will ambulate with normal gait pattern  without AD.        Start:  07/31/25    Expected End:  09/11/25               Functional outcome       Patient stated goal: Patient to return to previous level of function.        Start:  07/31/25    Expected End:  09/11/25            Patient will demonstrate independence in home program for support of progression       Start:  07/31/25    Expected End:  09/11/25               Pain       Patient will report pain of 2/10 demonstrating a reduction of overall pain       Start:  07/31/25    Expected End:  09/11/25               Range of Motion       Patient will achieve left knee ROM of  0-115 degrees        Start:  07/31/25    Expected End:  09/11/25               Strength       Patient will achieve left knee flexion strength of 4/5       Start:  07/31/25    Expected End:  09/11/25            Patient will achieve left knee extension strength of 4/5       Start:  07/31/25    Expected End:  09/11/25                Kelly Wheeler PT           Current Participants as of 7/31/2025    Name Type Comments Contact Info    Paula Hale PA-C Referring Provider  [phone]    Signature pending            Sincerely,      Kelly Wheeler PT  [facility]                                                            Dear Kelly Wheeler PT,    RE: Mr. Geo Moss, MRN: [id number]    I certify that I have reviewed the attached plan of care and agree to the details within.        ___________________________  ___________________________  Provider Printed Name   Provider Signed Name      ___________________________  Date and Time

## 2025-08-04 ENCOUNTER — CLINICAL SUPPORT (OUTPATIENT)
Dept: REHABILITATION | Facility: HOSPITAL | Age: 65
End: 2025-08-04
Payer: MEDICARE

## 2025-08-04 DIAGNOSIS — Z96.652 S/P TOTAL KNEE ARTHROPLASTY, LEFT: Primary | ICD-10-CM

## 2025-08-04 PROCEDURE — 97110 THERAPEUTIC EXERCISES: CPT | Mod: PN

## 2025-08-04 PROCEDURE — 97140 MANUAL THERAPY 1/> REGIONS: CPT | Mod: PN

## 2025-08-04 NOTE — PROGRESS NOTES
Outpatient Rehab    Physical Therapy Visit    Patient Name: Geo Moss  MRN: [id number]  YOB: 1960  Encounter Date: 8/4/2025    Therapy Diagnosis:   Encounter Diagnosis   Name Primary?    S/P total knee arthroplasty, left Yes     Physician: Paula Hale P*    Physician Orders: Eval and Treat  Medical Diagnosis: S/P total knee arthroplasty, left  Surgical Diagnosis: L TKA   Surgical Date: 7/28/2025  Days Since Last Surgery: 7    Visit # / Visits Authorized:  1 / 30  Insurance Authorization Period: 7/29/2025 to 12/31/2025  Date of Evaluation: 7/31/2025  Plan of Care Certification: 7/31/2025 to 9/11/2025      PT/PTA:     Number of PTA visits since last PT visit:   Time In: 1100   Time Out: 1140  Total Time (in minutes): 40   Total Billable Time (in minutes): 40    FOTO:  Intake Score (%): 42  Survey Score 2 (%): Not applicable for this Episode  Survey Score 3 (%): Not applicable for this Episode    Precautions:         Subjective   He reports that he has been doing his exercises at home..  Pain reported as 5/10.      Objective            Treatment:  Therapeutic Exercise  TE 2: 2 x 10 RTB ankle pumps  TE 3: 2 x 10 SAQ with AAROM  TE 4: 2 x 10 heel slides with strap  Manual Therapy  MT 1: PROM L knee flexion, patella mobs  Therapeutic Activity  TA 1: 2 x 10 bridge with bolster  Modalities  Cryotherapy (Minutes\Location): 5 min post-treatment  Electrical Stimulation (Parameters): 10 min to L quad/VMO with quad set 10 sec on/off    Time Entry(in minutes):  Manual Therapy Time Entry: 5  Therapeutic Activity Time Entry: 5  Therapeutic Exercise Time Entry: 30    Assessment & Plan   Assessment: Patient extremely limited with left knee flexion by pain and tightness. Will continue to progress as tolerated.        The patient will continue to benefit from skilled outpatient physical therapy in order to address the deficits listed in the problem list on the initial evaluation, provide patient and family  education, and maximize the patients level of independence in the home and community environments.     The patient's spiritual, cultural, and educational needs were considered, and the patient is agreeable to the plan of care and goals.           Plan: Cont with POC.    Goals:   Active       Ambulation/movement       Patient will ambulate with normal gait pattern without AD.        Start:  07/31/25    Expected End:  09/11/25               Functional outcome       Patient stated goal: Patient to return to previous level of function.        Start:  07/31/25    Expected End:  09/11/25            Patient will demonstrate independence in home program for support of progression       Start:  07/31/25    Expected End:  09/11/25               Pain       Patient will report pain of 2/10 demonstrating a reduction of overall pain       Start:  07/31/25    Expected End:  09/11/25               Range of Motion       Patient will achieve left knee ROM of  0-115 degrees        Start:  07/31/25    Expected End:  09/11/25               Strength       Patient will achieve left knee flexion strength of 4/5       Start:  07/31/25    Expected End:  09/11/25            Patient will achieve left knee extension strength of 4/5       Start:  07/31/25    Expected End:  09/11/25                Kelly Wheeler, PT

## 2025-08-07 ENCOUNTER — CLINICAL SUPPORT (OUTPATIENT)
Dept: REHABILITATION | Facility: HOSPITAL | Age: 65
End: 2025-08-07
Payer: MEDICARE

## 2025-08-07 DIAGNOSIS — Z96.652 S/P TOTAL KNEE ARTHROPLASTY, LEFT: Primary | ICD-10-CM

## 2025-08-07 PROCEDURE — 97530 THERAPEUTIC ACTIVITIES: CPT | Mod: PN,CQ

## 2025-08-07 PROCEDURE — 97110 THERAPEUTIC EXERCISES: CPT | Mod: PN,CQ

## 2025-08-07 NOTE — PROGRESS NOTES
Outpatient Rehab    Physical Therapy Visit    Patient Name: Geo Moss  MRN: [id number]  YOB: 1960  Encounter Date: 8/7/2025    Therapy Diagnosis:   Encounter Diagnosis   Name Primary?    S/P total knee arthroplasty, left Yes     Physician: Paula Hale P*    Physician Orders: Eval and Treat  Medical Diagnosis: S/P total knee arthroplasty, left  Surgical Diagnosis: L TKA   Surgical Date: 7/28/2025  Days Since Last Surgery: 10    Visit # / Visits Authorized:  2 / 30  Insurance Authorization Period: 7/29/2025 to 12/31/2025  Date of Evaluation: 7/31/2025  Plan of Care Certification: 7/31/2025 to 9/11/2025      PT/PTA: PTA   Number of PTA visits since last PT visit:1  Time In: 1259   Time Out: 1342  Total Time (in minutes): 43   Total Billable Time (in minutes): 43    FOTO:  Intake Score (%): 42  Survey Score 2 (%): Not applicable for this Episode  Survey Score 3 (%): Not applicable for this Episode    Precautions:         Subjective   Pt reports he has a history of failed ACL repair..  Pain reported as 5/10. L knee    Objective            Treatment:  Therapeutic Exercise  TE 1: 2 x 10 QS/glute sets with ball  TE 2: 3 x 10 RTB ankle pumps while in SAQ position  TE 3: 2 x 10 SAQ  TE 4: 2 x 10 heel slides with strap  TE 5: 2 x 10 HS curls seated RTB  TE 6: cycle rocking x 3'  Manual Therapy  MT 1: PROM L knee flexion supine  Therapeutic Activity  TA 1: 2 x 10 bridge with bolster  TA 2: sit<>stand 2 x 10 elevated mat  TA 3: standing heel raises 2 x 10  TA 4: step tap 20x's    Time Entry(in minutes):  Manual Therapy Time Entry: 5  Therapeutic Activity Time Entry: 15  Therapeutic Exercise Time Entry: 23    Assessment & Plan   Assessment: Pt tends to demonstrate muscle guarding and hip hike compensations. Provided education with pt becoming more aware. Instructed pt to practice sit<>stand at home with increased knee flexion/extension as tolerated. He verbalized understanding.   Evaluation/Treatment  Tolerance: Patient limited by pain    The patient will continue to benefit from skilled outpatient physical therapy in order to address the deficits listed in the problem list on the initial evaluation, provide patient and family education, and maximize the patients level of independence in the home and community environments.     The patient's spiritual, cultural, and educational needs were considered, and the patient is agreeable to the plan of care and goals.           Plan: Cont with POC.    Goals:   Active       Ambulation/movement       Patient will ambulate with normal gait pattern without AD.        Start:  07/31/25    Expected End:  09/11/25               Functional outcome       Patient stated goal: Patient to return to previous level of function.        Start:  07/31/25    Expected End:  09/11/25            Patient will demonstrate independence in home program for support of progression       Start:  07/31/25    Expected End:  09/11/25               Pain       Patient will report pain of 2/10 demonstrating a reduction of overall pain       Start:  07/31/25    Expected End:  09/11/25               Range of Motion       Patient will achieve left knee ROM of  0-115 degrees        Start:  07/31/25    Expected End:  09/11/25               Strength       Patient will achieve left knee flexion strength of 4/5       Start:  07/31/25    Expected End:  09/11/25            Patient will achieve left knee extension strength of 4/5       Start:  07/31/25    Expected End:  09/11/25                Nallely Neal PTA

## 2025-08-11 ENCOUNTER — CLINICAL SUPPORT (OUTPATIENT)
Dept: REHABILITATION | Facility: HOSPITAL | Age: 65
End: 2025-08-11
Payer: MEDICARE

## 2025-08-11 DIAGNOSIS — Z96.652 S/P TOTAL KNEE ARTHROPLASTY, LEFT: Primary | ICD-10-CM

## 2025-08-11 PROCEDURE — 97530 THERAPEUTIC ACTIVITIES: CPT | Mod: PN

## 2025-08-11 PROCEDURE — 97110 THERAPEUTIC EXERCISES: CPT | Mod: PN

## 2025-08-14 ENCOUNTER — CLINICAL SUPPORT (OUTPATIENT)
Dept: REHABILITATION | Facility: HOSPITAL | Age: 65
End: 2025-08-14
Payer: MEDICARE

## 2025-08-14 DIAGNOSIS — Z96.652 S/P TOTAL KNEE ARTHROPLASTY, LEFT: Primary | ICD-10-CM

## 2025-08-14 PROCEDURE — 97110 THERAPEUTIC EXERCISES: CPT | Mod: PN,CQ

## 2025-08-14 PROCEDURE — 97530 THERAPEUTIC ACTIVITIES: CPT | Mod: PN,CQ

## 2025-08-25 ENCOUNTER — CLINICAL SUPPORT (OUTPATIENT)
Dept: REHABILITATION | Facility: HOSPITAL | Age: 65
End: 2025-08-25
Payer: MEDICARE

## 2025-08-25 DIAGNOSIS — Z96.652 S/P TOTAL KNEE ARTHROPLASTY, LEFT: Primary | ICD-10-CM

## 2025-08-25 PROCEDURE — 97530 THERAPEUTIC ACTIVITIES: CPT | Mod: PN

## 2025-08-25 PROCEDURE — 97110 THERAPEUTIC EXERCISES: CPT | Mod: PN

## 2025-09-04 ENCOUNTER — TELEPHONE (OUTPATIENT)
Dept: UROLOGY | Facility: CLINIC | Age: 65
End: 2025-09-04
Payer: MEDICARE